# Patient Record
Sex: FEMALE | Race: WHITE | Employment: OTHER | URBAN - METROPOLITAN AREA
[De-identification: names, ages, dates, MRNs, and addresses within clinical notes are randomized per-mention and may not be internally consistent; named-entity substitution may affect disease eponyms.]

---

## 2017-05-16 ENCOUNTER — TELEPHONE (OUTPATIENT)
Dept: FAMILY MEDICINE CLINIC | Facility: CLINIC | Age: 27
End: 2017-05-16

## 2017-12-29 ENCOUNTER — OFFICE VISIT (OUTPATIENT)
Dept: FAMILY MEDICINE CLINIC | Facility: CLINIC | Age: 27
End: 2017-12-29

## 2017-12-29 VITALS
BODY MASS INDEX: 21 KG/M2 | SYSTOLIC BLOOD PRESSURE: 110 MMHG | WEIGHT: 117 LBS | HEART RATE: 120 BPM | DIASTOLIC BLOOD PRESSURE: 60 MMHG | TEMPERATURE: 98 F | OXYGEN SATURATION: 96 %

## 2017-12-29 DIAGNOSIS — R50.9 FEVER, UNSPECIFIED FEVER CAUSE: ICD-10-CM

## 2017-12-29 DIAGNOSIS — J02.9 SORE THROAT: Primary | ICD-10-CM

## 2017-12-29 DIAGNOSIS — J02.0 STREP THROAT: ICD-10-CM

## 2017-12-29 PROCEDURE — 87880 STREP A ASSAY W/OPTIC: CPT | Performed by: FAMILY MEDICINE

## 2017-12-29 PROCEDURE — 99214 OFFICE O/P EST MOD 30 MIN: CPT | Performed by: FAMILY MEDICINE

## 2017-12-29 RX ORDER — AMOXICILLIN 500 MG/1
500 CAPSULE ORAL 2 TIMES DAILY
Qty: 14 CAPSULE | Refills: 0 | Status: SHIPPED | OUTPATIENT
Start: 2017-12-29 | End: 2018-01-05

## 2017-12-29 RX ORDER — OSELTAMIVIR PHOSPHATE 75 MG/1
75 CAPSULE ORAL 2 TIMES DAILY
Qty: 10 CAPSULE | Refills: 0 | Status: SHIPPED | OUTPATIENT
Start: 2017-12-29 | End: 2017-12-29 | Stop reason: CLARIF

## 2017-12-29 NOTE — PROGRESS NOTES
HPI:   Dheeraj Donovan is a 33year old female who presents for upper respiratory symptoms for  2  days. Patient reports sore throat, congestion, fever with Tmax to 102, dry cough. Has diffuse myalgias fever and chills and shakes, ssore throat started today fever cause  Strep throat      Orders Placed This Encounter      Rapid Strep    Meds & Refills for this Visit:  Signed Prescriptions Disp Refills    amoxicillin 500 MG Oral Cap 14 capsule 0      Sig: Take 1 capsule (500 mg total) by mouth 2 (two) times rodrigo

## 2018-03-13 ENCOUNTER — OFFICE VISIT (OUTPATIENT)
Dept: FAMILY MEDICINE CLINIC | Facility: CLINIC | Age: 28
End: 2018-03-13

## 2018-03-13 VITALS
HEIGHT: 64 IN | SYSTOLIC BLOOD PRESSURE: 112 MMHG | HEART RATE: 64 BPM | RESPIRATION RATE: 16 BRPM | DIASTOLIC BLOOD PRESSURE: 78 MMHG | WEIGHT: 121.19 LBS | TEMPERATURE: 98 F | BODY MASS INDEX: 20.69 KG/M2

## 2018-03-13 DIAGNOSIS — I73.00 RAYNAUD'S PHENOMENON WITHOUT GANGRENE: Primary | ICD-10-CM

## 2018-03-13 DIAGNOSIS — M79.675 GREAT TOE PAIN, LEFT: ICD-10-CM

## 2018-03-13 PROCEDURE — 99213 OFFICE O/P EST LOW 20 MIN: CPT | Performed by: FAMILY MEDICINE

## 2018-03-13 NOTE — PROGRESS NOTES
Candy Kaplan is a 33year old female. 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  She also not Consults:  None     The patient indicates understanding of these issues and agrees to the plan. The patient is asked to return in prn.

## 2018-09-12 ENCOUNTER — OFFICE VISIT (OUTPATIENT)
Dept: FAMILY MEDICINE CLINIC | Facility: CLINIC | Age: 28
End: 2018-09-12
Payer: MEDICAID

## 2018-09-12 VITALS
SYSTOLIC BLOOD PRESSURE: 122 MMHG | BODY MASS INDEX: 20 KG/M2 | DIASTOLIC BLOOD PRESSURE: 70 MMHG | HEART RATE: 108 BPM | WEIGHT: 115.63 LBS | RESPIRATION RATE: 16 BRPM | TEMPERATURE: 100 F

## 2018-09-12 DIAGNOSIS — J06.9 VIRAL URI: ICD-10-CM

## 2018-09-12 DIAGNOSIS — J02.9 PHARYNGITIS, UNSPECIFIED ETIOLOGY: Primary | ICD-10-CM

## 2018-09-12 DIAGNOSIS — R50.9 FEVER, UNSPECIFIED FEVER CAUSE: ICD-10-CM

## 2018-09-12 LAB
CONTROL LINE PRESENT WITH A CLEAR BACKGROUND (YES/NO): YES YES/NO
STREP GRP A CUL-SCR: NEGATIVE

## 2018-09-12 PROCEDURE — 87880 STREP A ASSAY W/OPTIC: CPT | Performed by: FAMILY MEDICINE

## 2018-09-12 PROCEDURE — 99214 OFFICE O/P EST MOD 30 MIN: CPT | Performed by: FAMILY MEDICINE

## 2018-09-12 NOTE — PROGRESS NOTES
HPI:   Wes Gutiérrez is a 33year old female who presents for upper respiratory symptoms for  3  days. Patient reports sore throat, congestion, fever with Tmax to 101. No current outpatient medications on file.    Past Medical History:   Diagnosis Da No prescriptions requested or ordered in this encounter       Imaging & Consults:  None

## 2018-09-17 ENCOUNTER — TELEPHONE (OUTPATIENT)
Dept: FAMILY MEDICINE CLINIC | Facility: CLINIC | Age: 28
End: 2018-09-17

## 2018-09-17 RX ORDER — AMOXICILLIN AND CLAVULANATE POTASSIUM 500; 125 MG/1; MG/1
1 TABLET, FILM COATED ORAL 2 TIMES DAILY
Qty: 20 TABLET | Refills: 0 | Status: SHIPPED | OUTPATIENT
Start: 2018-09-17 | End: 2018-09-27

## 2018-09-17 NOTE — TELEPHONE ENCOUNTER
Patient was in last week to see Dr Wyatt Cuellar. Flu part is over but now has a sinus infection. Has been taking OTC Generic Mucinex and is not helping. Can Dr Wyatt Cuellar call something in for her?

## 2019-10-14 ENCOUNTER — OFFICE VISIT (OUTPATIENT)
Dept: FAMILY MEDICINE CLINIC | Facility: CLINIC | Age: 29
End: 2019-10-14
Payer: MEDICAID

## 2019-10-14 VITALS
RESPIRATION RATE: 18 BRPM | TEMPERATURE: 97 F | SYSTOLIC BLOOD PRESSURE: 126 MMHG | OXYGEN SATURATION: 98 % | DIASTOLIC BLOOD PRESSURE: 64 MMHG | HEART RATE: 68 BPM | HEIGHT: 63 IN | WEIGHT: 119.63 LBS | BODY MASS INDEX: 21.2 KG/M2

## 2019-10-14 DIAGNOSIS — R51.9 FACIAL PAIN: ICD-10-CM

## 2019-10-14 DIAGNOSIS — A46: Primary | ICD-10-CM

## 2019-10-14 DIAGNOSIS — S61.218A: ICD-10-CM

## 2019-10-14 DIAGNOSIS — M79.644 PAIN IN FINGER OF RIGHT HAND: ICD-10-CM

## 2019-10-14 PROCEDURE — 99213 OFFICE O/P EST LOW 20 MIN: CPT | Performed by: FAMILY MEDICINE

## 2019-10-14 PROCEDURE — 12031 INTMD RPR S/A/T/EXT 2.5 CM/<: CPT | Performed by: FAMILY MEDICINE

## 2019-10-14 RX ORDER — CLINDAMYCIN HYDROCHLORIDE 300 MG/1
CAPSULE ORAL 3 TIMES DAILY
COMMUNITY
End: 2021-04-03

## 2019-10-14 NOTE — PROGRESS NOTES
Barbara Hoff is a 30year old female. HPI:   Karin cut her finger about a week ago on some chicken wire, she waited 24 hours and then went to the ER where she was given a tdap and then placed on ABX because it was too late to suture.  The finger was m finger of right hand    The site was cleaned and then closed with dermabond to try and get better approximation, keep clean and dry and allow it to peel off itself    Meds & Refills for this Visit:  Requested Prescriptions      No prescriptions requested o

## 2020-07-16 ENCOUNTER — TELEPHONE (OUTPATIENT)
Dept: FAMILY MEDICINE CLINIC | Facility: CLINIC | Age: 30
End: 2020-07-16

## 2020-07-16 NOTE — TELEPHONE ENCOUNTER
Routing to Dr. Catherne Angelucci.     Future Appointments   Date Time Provider Silvia Cash   7/20/2020  3:40 PM Edith Moreland, [facility] ALONA [place]
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
## 2020-09-24 ENCOUNTER — TELEPHONE (OUTPATIENT)
Dept: FAMILY MEDICINE CLINIC | Facility: CLINIC | Age: 30
End: 2020-09-24

## 2020-09-24 DIAGNOSIS — R05.8 COUGH WITH EXPOSURE TO COVID-19 VIRUS: Primary | ICD-10-CM

## 2020-09-24 DIAGNOSIS — Z20.822 COUGH WITH EXPOSURE TO COVID-19 VIRUS: Primary | ICD-10-CM

## 2020-09-24 NOTE — TELEPHONE ENCOUNTER
Last contact with positive pt was last Thursday- they found out they were positive yesterday. There was no masking or social distancing during encounter    Pt states she is having fatigue, sinus pressure, runny nose and diarrhea.   Pt also reports swolle
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
## 2020-09-25 ENCOUNTER — APPOINTMENT (OUTPATIENT)
Dept: LAB | Age: 30
End: 2020-09-25
Attending: FAMILY MEDICINE
Payer: MEDICAID

## 2020-09-25 DIAGNOSIS — R05.8 COUGH WITH EXPOSURE TO COVID-19 VIRUS: ICD-10-CM

## 2020-09-25 DIAGNOSIS — Z20.822 COUGH WITH EXPOSURE TO COVID-19 VIRUS: ICD-10-CM

## 2020-09-28 ENCOUNTER — TELEPHONE (OUTPATIENT)
Dept: FAMILY MEDICINE CLINIC | Facility: CLINIC | Age: 30
End: 2020-09-28

## 2020-09-28 LAB — SARS-COV-2 RNA RESP QL NAA+PROBE: NOT DETECTED

## 2020-09-28 NOTE — TELEPHONE ENCOUNTER
----- Message from Julien Eckert DO sent at 9/28/2020  7:56 AM CDT -----  Can notify Her Covid was Negative

## 2021-04-03 ENCOUNTER — OFFICE VISIT (OUTPATIENT)
Dept: FAMILY MEDICINE CLINIC | Facility: CLINIC | Age: 31
End: 2021-04-03
Payer: MEDICAID

## 2021-04-03 VITALS
WEIGHT: 123 LBS | BODY MASS INDEX: 21.52 KG/M2 | SYSTOLIC BLOOD PRESSURE: 110 MMHG | TEMPERATURE: 99 F | DIASTOLIC BLOOD PRESSURE: 70 MMHG | HEART RATE: 93 BPM | HEIGHT: 63.5 IN | RESPIRATION RATE: 17 BRPM | OXYGEN SATURATION: 98 %

## 2021-04-03 DIAGNOSIS — L30.8 OTHER ECZEMA: Primary | ICD-10-CM

## 2021-04-03 DIAGNOSIS — L23.89 ALLERGIC CONTACT DERMATITIS DUE TO OTHER AGENTS: ICD-10-CM

## 2021-04-03 DIAGNOSIS — F90.9 ATTENTION DEFICIT HYPERACTIVITY DISORDER (ADHD), UNSPECIFIED ADHD TYPE: ICD-10-CM

## 2021-04-03 PROCEDURE — 99214 OFFICE O/P EST MOD 30 MIN: CPT | Performed by: FAMILY MEDICINE

## 2021-04-03 PROCEDURE — 3008F BODY MASS INDEX DOCD: CPT | Performed by: FAMILY MEDICINE

## 2021-04-03 PROCEDURE — 3078F DIAST BP <80 MM HG: CPT | Performed by: FAMILY MEDICINE

## 2021-04-03 PROCEDURE — 3074F SYST BP LT 130 MM HG: CPT | Performed by: FAMILY MEDICINE

## 2021-04-03 NOTE — PROGRESS NOTES
Olamide Hope is a 35year old female. HPI:   Karin is here for evaluation of rash , on her upper body and arms. She notes it was worse this winter and was spreading to her face, states it is dry and burns. Has a HX of allergic rhinitis.  Taking chemo normocephalic,ears and throat are clear  NECK: supple,no adenopathy,no bruits  LUNGS: clear to auscultation  CARDIO: RRR without murmur  GI: good BS's,no masses, HSM or tenderness  EXTREMITIES: no cyanosis, clubbing or edema    ASSESSMENT AND PLAN:     Oth

## 2021-06-16 ENCOUNTER — TELEMEDICINE (OUTPATIENT)
Dept: FAMILY MEDICINE CLINIC | Facility: CLINIC | Age: 31
End: 2021-06-16
Payer: MEDICAID

## 2021-06-16 DIAGNOSIS — R05.9 COUGH: Primary | ICD-10-CM

## 2021-06-16 DIAGNOSIS — R53.83 FATIGUE, UNSPECIFIED TYPE: ICD-10-CM

## 2021-06-16 DIAGNOSIS — R09.81 SINUS CONGESTION: ICD-10-CM

## 2021-06-16 PROCEDURE — 99213 OFFICE O/P EST LOW 20 MIN: CPT | Performed by: FAMILY MEDICINE

## 2021-06-16 NOTE — PROGRESS NOTES
This is a telemedicine visit with live, interactive video and audio. Patient understands and accepts financial responsibility for any deductible, co-insurance and/or co-pays associated with this service.     VASQUEZ Frazier has been sick for 3 days

## 2021-06-17 ENCOUNTER — LAB ENCOUNTER (OUTPATIENT)
Dept: LAB | Age: 31
End: 2021-06-17
Attending: FAMILY MEDICINE
Payer: MEDICAID

## 2021-06-17 DIAGNOSIS — R09.81 SINUS CONGESTION: ICD-10-CM

## 2021-06-17 DIAGNOSIS — R05.9 COUGH: ICD-10-CM

## 2021-06-18 ENCOUNTER — TELEPHONE (OUTPATIENT)
Dept: FAMILY MEDICINE CLINIC | Facility: CLINIC | Age: 31
End: 2021-06-18

## 2021-09-08 NOTE — TELEPHONE ENCOUNTER
LOV: 4/3/21   Last Refill: 2016    RN called pt- she states that DS discussed it with her in April,    She has been treating ADD Holistically - but it isn't currently     ADDERALL 20MG TABLETS/ D-AMPHETAMINE SALT COMBO 20MG TAB

## 2021-09-08 NOTE — TELEPHONE ENCOUNTER
Pt calling for prescription refill on     ADDERALL 20MG TABLETS/ D-AMPHETAMINE SALT COMBO 20MG TAB     Pt verified pharmacy on     [address] Julien Browne [address] [place] [address] ([address]), [phone], [phone]

## 2021-09-16 ENCOUNTER — TELEPHONE (OUTPATIENT)
Dept: FAMILY MEDICINE CLINIC | Facility: CLINIC | Age: 31
End: 2021-09-16

## 2021-09-16 NOTE — TELEPHONE ENCOUNTER
MERCEDES for pt to call back.   [facility] sent to pt kathy DIEZ for pt Amphetamine-dextroamphetamine 20mg was denied    Pt will have to pay out of pocket for this medication

## 2021-12-07 ENCOUNTER — TELEPHONE (OUTPATIENT)
Dept: FAMILY MEDICINE CLINIC | Facility: CLINIC | Age: 31
End: 2021-12-07

## 2021-12-07 ENCOUNTER — NURSE ONLY (OUTPATIENT)
Dept: FAMILY MEDICINE CLINIC | Facility: CLINIC | Age: 31
End: 2021-12-07
Payer: MEDICAID

## 2021-12-07 DIAGNOSIS — R35.0 URINARY FREQUENCY: Primary | ICD-10-CM

## 2021-12-07 PROCEDURE — 81003 URINALYSIS AUTO W/O SCOPE: CPT | Performed by: FAMILY MEDICINE

## 2021-12-07 NOTE — PROGRESS NOTES
Pt was in office for provider urine for testing    In office test was done and results sent to the provider

## 2021-12-07 NOTE — TELEPHONE ENCOUNTER
Pt states she is having frequency and discomfort when she is urinating. Does not describe it burning. Pt denies abdominal pressure and flank pain. SXS have been going on and off for a few months.   Pt has been trying to deal with it naturally with cra

## 2021-12-07 NOTE — TELEPHONE ENCOUNTER
Pt called she is wanting to know if she can come in to office and give a urine sample and get an antibiotic. She states that her body has been weird for the past week months and she has weird sensation when going to the bathroom.  She also said that she has

## 2021-12-07 NOTE — TELEPHONE ENCOUNTER
Pt was advised    Future Appointments   Date Time Provider Silvia Cash   12/7/2021  2:00 PM  [facility] [address] [facility] [place]

## 2021-12-07 NOTE — TELEPHONE ENCOUNTER
----- Message from Latonya Vázquez DO sent at 12/7/2021  4:21 PM CST -----  Can notify her Urine was negative, lets try some uristat for a couple

## 2022-02-02 ENCOUNTER — TELEPHONE (OUTPATIENT)
Dept: FAMILY MEDICINE CLINIC | Facility: CLINIC | Age: 32
End: 2022-02-02

## 2022-02-02 NOTE — TELEPHONE ENCOUNTER
Pt states she talked to her therapist this morning and they revisted her ADHD    She states she has been struggling recently. Pt stopped taking medication when she was pregnant with her son. Looks like we tried to send meds in September -and everything was denied    Her therapist told her there is a cheek swab test to tell her what medication would be best for her?     PT is going to come in next week for a medication visit so we can document her history on medication so we have information to provider to insurance    Future Appointments   Date Time Provider Silvia Cash   2/8/2022  2:20 PM Tara Moreland, [facility][note 3] Angélica Titus

## 2022-02-02 NOTE — TELEPHONE ENCOUNTER
Pt called would like a call back from nurse states has questions regarding a medication       Pt call back # [phone]    Thank you

## 2022-02-09 ENCOUNTER — TELEPHONE (OUTPATIENT)
Dept: FAMILY MEDICINE CLINIC | Facility: CLINIC | Age: 32
End: 2022-02-09

## 2022-02-09 NOTE — TELEPHONE ENCOUNTER
AGUSTÍN IS WORKING ON PRE-AUTH-  NEED DIAG AND CHART NOTES FOR PT.     #-[phone]      PLEASE ADVISE-THANKYOU

## 2022-03-21 ENCOUNTER — TELEPHONE (OUTPATIENT)
Dept: FAMILY MEDICINE CLINIC | Facility: CLINIC | Age: 32
End: 2022-03-21

## 2022-03-21 NOTE — TELEPHONE ENCOUNTER
Pt states started with sxs on Thursday last week    Has been taking Mucinex BID- but it is not helping much    R Ear  are clogged today, R lymph nodes are swollenn, eye feels heavy and goopy- but no drainage     Pt is having lots of sinus drainage- it is clear. PT is having PND and slight cough    No temp, no headache.     Routing to provider pt is looking for recommendations or meds called in

## 2022-03-21 NOTE — TELEPHONE ENCOUNTER
PT CALLED AND ADV HAS FACIAL PUFFINESS, EAR CLOGGED, SNOTTY, LOTS OF HEAD PRESSURE.    WOULD LIKE TO HAVE SOMETHING CALLED IN - PT ADV NEEDS TO GO TO WORK.     PLEASE SEND  [address]

## 2022-03-21 NOTE — TELEPHONE ENCOUNTER
Pt is coming in   Future Appointments   Date Time Provider Silvia Cash   3/22/2022  8:20 AM Good Mendieta DO [facility] [place]   3/29/2022 11:20 AM DO ABNER Shaw [facility] [place]     Pt advised that daughter will be with her and she does not want medications discussed at time of apt, she will keep her medication f/u for next week.

## 2022-03-22 ENCOUNTER — OFFICE VISIT (OUTPATIENT)
Dept: FAMILY MEDICINE CLINIC | Facility: CLINIC | Age: 32
End: 2022-03-22
Payer: MEDICAID

## 2022-03-22 VITALS
BODY MASS INDEX: 22.15 KG/M2 | SYSTOLIC BLOOD PRESSURE: 100 MMHG | DIASTOLIC BLOOD PRESSURE: 60 MMHG | OXYGEN SATURATION: 99 % | HEART RATE: 70 BPM | TEMPERATURE: 98 F | WEIGHT: 125 LBS | HEIGHT: 63 IN

## 2022-03-22 DIAGNOSIS — J02.8 PHARYNGITIS DUE TO OTHER ORGANISM: ICD-10-CM

## 2022-03-22 DIAGNOSIS — R09.81 SINUS CONGESTION: Primary | ICD-10-CM

## 2022-03-22 PROCEDURE — 3008F BODY MASS INDEX DOCD: CPT | Performed by: FAMILY MEDICINE

## 2022-03-22 PROCEDURE — 3078F DIAST BP <80 MM HG: CPT | Performed by: FAMILY MEDICINE

## 2022-03-22 PROCEDURE — 3074F SYST BP LT 130 MM HG: CPT | Performed by: FAMILY MEDICINE

## 2022-03-22 PROCEDURE — 99213 OFFICE O/P EST LOW 20 MIN: CPT | Performed by: FAMILY MEDICINE

## 2022-07-26 RX ORDER — DEXTROAMPHETAMINE SACCHARATE, AMPHETAMINE ASPARTATE, DEXTROAMPHETAMINE SULFATE AND AMPHETAMINE SULFATE 2.5; 2.5; 2.5; 2.5 MG/1; MG/1; MG/1; MG/1
TABLET ORAL
Qty: 30 TABLET | Refills: 0 | Status: SHIPPED | OUTPATIENT
Start: 2022-07-26

## 2022-07-26 NOTE — TELEPHONE ENCOUNTER
No refill protocol for this medication. Last refill: 5/24/2022 #30 with 0 refills  Last Visit: 5/24/2022  Next Visit: No future appointments. Forward to Dr. Nancy Bautista please advise on refills. Thanks.

## 2022-07-26 NOTE — TELEPHONE ENCOUNTER
[facility] DRUG STORE [address] [address] [address] AT [address] [facility] ([address]), [phone], [phone]   [address]   Phone: [phone] Fax: [phone]     PATIENT REQUESTING REFILL ON ADDERALL

## 2022-11-19 ENCOUNTER — TELEPHONE (OUTPATIENT)
Dept: FAMILY MEDICINE CLINIC | Facility: CLINIC | Age: 32
End: 2022-11-19

## 2022-11-19 RX ORDER — SULFAMETHOXAZOLE AND TRIMETHOPRIM 800; 160 MG/1; MG/1
1 TABLET ORAL 2 TIMES DAILY
Qty: 10 TABLET | Refills: 0 | Status: SHIPPED | OUTPATIENT
Start: 2022-11-19 | End: 2022-11-24

## 2022-11-19 NOTE — TELEPHONE ENCOUNTER
UTI symptoms x a couple weeks but they subsided a bit with natural remedies    Last night, she had very painful spasms and symptoms have worsened drastically    Urinary frequency  Pain with urination  Fatigued    Patient would like antibiotic    Please adv  Thank you

## 2022-11-19 NOTE — TELEPHONE ENCOUNTER
Spoke with patient, increase in frequency/pain while urinating/urgency/abdominal cramping x last night. No fever.     [facility] in [place] on [address] to provider    Please advise

## 2022-11-29 RX ORDER — DEXTROAMPHETAMINE SACCHARATE, AMPHETAMINE ASPARTATE, DEXTROAMPHETAMINE SULFATE AND AMPHETAMINE SULFATE 2.5; 2.5; 2.5; 2.5 MG/1; MG/1; MG/1; MG/1
TABLET ORAL
Qty: 30 TABLET | Refills: 0 | Status: SHIPPED | OUTPATIENT
Start: 2022-11-29

## 2022-11-29 NOTE — TELEPHONE ENCOUNTER
Routing to provider per protocol. amphetamine-dextroamphetamine (ADDERALL) 10 MG Oral Tab  Last refilled on 7/26/22 for #30  with 0 rf. Last seen on 5/24/22. No future appointments. Thank you.

## 2023-01-16 ENCOUNTER — PATIENT MESSAGE (OUTPATIENT)
Dept: FAMILY MEDICINE CLINIC | Facility: CLINIC | Age: 33
End: 2023-01-16

## 2023-02-04 ENCOUNTER — TELEPHONE (OUTPATIENT)
Dept: FAMILY MEDICINE CLINIC | Facility: CLINIC | Age: 33
End: 2023-02-04

## 2023-02-04 ENCOUNTER — OFFICE VISIT (OUTPATIENT)
Dept: FAMILY MEDICINE CLINIC | Facility: CLINIC | Age: 33
End: 2023-02-04
Payer: MEDICAID

## 2023-02-04 VITALS
SYSTOLIC BLOOD PRESSURE: 130 MMHG | HEIGHT: 63 IN | DIASTOLIC BLOOD PRESSURE: 76 MMHG | RESPIRATION RATE: 18 BRPM | WEIGHT: 117 LBS | HEART RATE: 80 BPM | TEMPERATURE: 98 F | BODY MASS INDEX: 20.73 KG/M2

## 2023-02-04 DIAGNOSIS — M54.2 NECK PAIN ON RIGHT SIDE: Primary | ICD-10-CM

## 2023-02-04 DIAGNOSIS — M25.511 ACUTE PAIN OF RIGHT SHOULDER: ICD-10-CM

## 2023-02-04 DIAGNOSIS — M54.12 CERVICAL RADICULOPATHY: ICD-10-CM

## 2023-02-04 DIAGNOSIS — M54.40 ACUTE RIGHT-SIDED LOW BACK PAIN WITH SCIATICA, SCIATICA LATERALITY UNSPECIFIED: ICD-10-CM

## 2023-02-04 PROCEDURE — 99214 OFFICE O/P EST MOD 30 MIN: CPT | Performed by: FAMILY MEDICINE

## 2023-02-04 PROCEDURE — 3078F DIAST BP <80 MM HG: CPT | Performed by: FAMILY MEDICINE

## 2023-02-04 PROCEDURE — 3008F BODY MASS INDEX DOCD: CPT | Performed by: FAMILY MEDICINE

## 2023-02-04 PROCEDURE — 3075F SYST BP GE 130 - 139MM HG: CPT | Performed by: FAMILY MEDICINE

## 2023-02-04 RX ORDER — CYCLOBENZAPRINE HCL 10 MG
10 TABLET ORAL NIGHTLY
Qty: 10 TABLET | Refills: 0 | Status: SHIPPED | OUTPATIENT
Start: 2023-02-04 | End: 2023-02-14

## 2023-02-04 RX ORDER — NABUMETONE 500 MG/1
500 TABLET, FILM COATED ORAL 2 TIMES DAILY
Qty: 30 TABLET | Refills: 0 | Status: SHIPPED | OUTPATIENT
Start: 2023-02-04 | End: 2023-02-18

## 2023-02-04 NOTE — TELEPHONE ENCOUNTER
Patient called states has been having some pain in body feels sore and can not move right arm, also has some swelling at times     Pt was offered Monday but would like to be seen today if possible       Pt call back # [phone]    Thank you.
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
## 2023-02-07 ENCOUNTER — TELEPHONE (OUTPATIENT)
Dept: FAMILY MEDICINE CLINIC | Facility: CLINIC | Age: 33
End: 2023-02-07

## 2023-02-07 NOTE — TELEPHONE ENCOUNTER
Pt states she doesn't really know what she is needing    She states she doesn't think pain is getting worse- but she states she also hasn't taken much time to heal.    Pt states she is going to try to get some rest and let us know if things do not change.

## 2023-02-07 NOTE — TELEPHONE ENCOUNTER
Pt called she was seen Saturday. She said that she is in a lot of pain and wants to know if she could get x-ray done? Can  place orders?

## 2023-02-13 ENCOUNTER — OFFICE VISIT (OUTPATIENT)
Dept: FAMILY MEDICINE CLINIC | Facility: CLINIC | Age: 33
End: 2023-02-13
Payer: MEDICAID

## 2023-02-13 VITALS
RESPIRATION RATE: 16 BRPM | OXYGEN SATURATION: 98 % | TEMPERATURE: 98 F | HEART RATE: 88 BPM | SYSTOLIC BLOOD PRESSURE: 120 MMHG | DIASTOLIC BLOOD PRESSURE: 68 MMHG

## 2023-02-13 DIAGNOSIS — M25.511 ACUTE PAIN OF RIGHT SHOULDER: ICD-10-CM

## 2023-02-13 DIAGNOSIS — M25.521 ELBOW PAIN, RIGHT: ICD-10-CM

## 2023-02-13 DIAGNOSIS — M54.40 ACUTE RIGHT-SIDED LOW BACK PAIN WITH SCIATICA, SCIATICA LATERALITY UNSPECIFIED: ICD-10-CM

## 2023-02-13 DIAGNOSIS — M54.2 NECK PAIN ON RIGHT SIDE: Primary | ICD-10-CM

## 2023-02-13 PROCEDURE — 3074F SYST BP LT 130 MM HG: CPT | Performed by: FAMILY MEDICINE

## 2023-02-13 PROCEDURE — 99214 OFFICE O/P EST MOD 30 MIN: CPT | Performed by: FAMILY MEDICINE

## 2023-02-13 PROCEDURE — 3078F DIAST BP <80 MM HG: CPT | Performed by: FAMILY MEDICINE

## 2023-02-27 ENCOUNTER — OFFICE VISIT (OUTPATIENT)
Dept: FAMILY MEDICINE CLINIC | Facility: CLINIC | Age: 33
End: 2023-02-27
Payer: MEDICAID

## 2023-02-27 VITALS
TEMPERATURE: 98 F | WEIGHT: 124 LBS | SYSTOLIC BLOOD PRESSURE: 112 MMHG | BODY MASS INDEX: 21.97 KG/M2 | HEIGHT: 63 IN | OXYGEN SATURATION: 98 % | HEART RATE: 76 BPM | DIASTOLIC BLOOD PRESSURE: 80 MMHG | RESPIRATION RATE: 18 BRPM

## 2023-02-27 DIAGNOSIS — M25.521 ELBOW PAIN, RIGHT: ICD-10-CM

## 2023-02-27 DIAGNOSIS — M54.2 NECK PAIN ON RIGHT SIDE: Primary | ICD-10-CM

## 2023-02-27 DIAGNOSIS — M54.40 ACUTE RIGHT-SIDED LOW BACK PAIN WITH SCIATICA, SCIATICA LATERALITY UNSPECIFIED: ICD-10-CM

## 2023-02-27 DIAGNOSIS — M25.511 ACUTE PAIN OF RIGHT SHOULDER: ICD-10-CM

## 2023-02-27 PROCEDURE — 3008F BODY MASS INDEX DOCD: CPT | Performed by: FAMILY MEDICINE

## 2023-02-27 PROCEDURE — 99213 OFFICE O/P EST LOW 20 MIN: CPT | Performed by: FAMILY MEDICINE

## 2023-02-27 PROCEDURE — 3074F SYST BP LT 130 MM HG: CPT | Performed by: FAMILY MEDICINE

## 2023-02-27 PROCEDURE — 3079F DIAST BP 80-89 MM HG: CPT | Performed by: FAMILY MEDICINE

## 2023-02-28 ENCOUNTER — PATIENT MESSAGE (OUTPATIENT)
Dept: FAMILY MEDICINE CLINIC | Facility: CLINIC | Age: 33
End: 2023-02-28

## 2023-02-28 DIAGNOSIS — S46.001A ROTATOR CUFF INJURY, RIGHT, INITIAL ENCOUNTER: ICD-10-CM

## 2023-02-28 DIAGNOSIS — G89.11 ACUTE SHOULDER PAIN DUE TO TRAUMA, RIGHT: Primary | ICD-10-CM

## 2023-02-28 DIAGNOSIS — M25.511 ACUTE SHOULDER PAIN DUE TO TRAUMA, RIGHT: Primary | ICD-10-CM

## 2023-02-28 DIAGNOSIS — R29.898 WEAKNESS OF SHOULDER: ICD-10-CM

## 2023-03-13 ENCOUNTER — TELEPHONE (OUTPATIENT)
Dept: LAB | Age: 33
End: 2023-03-13

## 2023-03-13 DIAGNOSIS — S43.421D SPRAIN OF RIGHT ROTATOR CUFF CAPSULE, SUBSEQUENT ENCOUNTER: ICD-10-CM

## 2023-03-13 DIAGNOSIS — G89.11 ACUTE SHOULDER PAIN DUE TO TRAUMA, RIGHT: Primary | ICD-10-CM

## 2023-03-13 DIAGNOSIS — M25.511 ACUTE SHOULDER PAIN DUE TO TRAUMA, RIGHT: Primary | ICD-10-CM

## 2023-03-13 NOTE — TELEPHONE ENCOUNTER
The required for pt's MRI Shoulder has been denied by pt's health plan. [employer] is requesting a P2P that needs to be completed by 3/16/2023.     P2P # [phone], Option 4  Reference # [id number]    Pt is being notified of denial.    The following is the denial letter received from Ana Schmidt:

## 2023-03-16 NOTE — TELEPHONE ENCOUNTER
Pt was advised of the decision below- verbalized understanding    Pt states right now she has had improvement, but she is going to look into PT options and get back to us

## 2023-03-21 ENCOUNTER — TELEPHONE (OUTPATIENT)
Dept: FAMILY MEDICINE CLINIC | Facility: CLINIC | Age: 33
End: 2023-03-21

## 2023-03-21 DIAGNOSIS — S46.001A ROTATOR CUFF INJURY, RIGHT, INITIAL ENCOUNTER: ICD-10-CM

## 2023-03-21 DIAGNOSIS — R29.898 WEAKNESS OF SHOULDER: ICD-10-CM

## 2023-03-21 DIAGNOSIS — G89.11 ACUTE SHOULDER PAIN DUE TO TRAUMA, RIGHT: Primary | ICD-10-CM

## 2023-03-21 DIAGNOSIS — M25.511 ACUTE SHOULDER PAIN DUE TO TRAUMA, RIGHT: Primary | ICD-10-CM

## 2023-03-21 NOTE — TELEPHONE ENCOUNTER
RN LM for pt that message with information was sent via [address] number provided for any follow up questions

## 2023-03-21 NOTE — TELEPHONE ENCOUNTER
PT CALLED AND ADV THAT SHE HAS NO PREFERENCE ON PHYSICAL THERAPY     PLEASE CALL AND ADV    THANK YOU

## 2023-04-13 ENCOUNTER — APPOINTMENT (OUTPATIENT)
Dept: PHYSICAL THERAPY | Age: 33
End: 2023-04-13
Attending: FAMILY MEDICINE
Payer: MEDICAID

## 2023-04-18 ENCOUNTER — TELEPHONE (OUTPATIENT)
Dept: PHYSICAL THERAPY | Facility: HOSPITAL | Age: 33
End: 2023-04-18

## 2023-04-18 NOTE — TELEPHONE ENCOUNTER
Rn spoke with Megan Trevino at [facility] and verified pt diagnosis and current dosage of medication    Megan Trevino states they have all the information that is needed- will await approval Detail Level: Zone Continue Regimen: clindamycin 1 % topical gel TP \\nSig: Apply every morning to acne\\n\\ntretinoin 0.1 % topical cream QHS\\nSig: Apply a pea size amount to acne QHS as skin tolerates Continue Regimen: Eucrisa 2 % topical ointment BID\\nSig: Apply to mild rash on hands BID as needed

## 2023-04-21 ENCOUNTER — APPOINTMENT (OUTPATIENT)
Dept: PHYSICAL THERAPY | Age: 33
End: 2023-04-21
Attending: FAMILY MEDICINE
Payer: MEDICAID

## 2023-04-25 ENCOUNTER — OFFICE VISIT (OUTPATIENT)
Dept: PHYSICAL THERAPY | Age: 33
End: 2023-04-25
Attending: FAMILY MEDICINE
Payer: MEDICAID

## 2023-04-25 PROCEDURE — 97110 THERAPEUTIC EXERCISES: CPT

## 2023-04-25 PROCEDURE — 97162 PT EVAL MOD COMPLEX 30 MIN: CPT

## 2023-04-28 ENCOUNTER — OFFICE VISIT (OUTPATIENT)
Dept: PHYSICAL THERAPY | Age: 33
End: 2023-04-28
Attending: FAMILY MEDICINE
Payer: MEDICAID

## 2023-04-28 PROCEDURE — 97140 MANUAL THERAPY 1/> REGIONS: CPT

## 2023-04-28 PROCEDURE — 97110 THERAPEUTIC EXERCISES: CPT

## 2023-05-01 ENCOUNTER — OFFICE VISIT (OUTPATIENT)
Dept: PHYSICAL THERAPY | Age: 33
End: 2023-05-01
Attending: FAMILY MEDICINE
Payer: MEDICAID

## 2023-05-01 PROCEDURE — 97140 MANUAL THERAPY 1/> REGIONS: CPT

## 2023-05-01 PROCEDURE — 97110 THERAPEUTIC EXERCISES: CPT

## 2023-05-02 ENCOUNTER — TELEPHONE (OUTPATIENT)
Dept: PHYSICAL THERAPY | Facility: HOSPITAL | Age: 33
End: 2023-05-02

## 2023-05-04 ENCOUNTER — TELEPHONE (OUTPATIENT)
Dept: PHYSICAL THERAPY | Facility: HOSPITAL | Age: 33
End: 2023-05-04

## 2023-05-05 ENCOUNTER — APPOINTMENT (OUTPATIENT)
Dept: PHYSICAL THERAPY | Age: 33
End: 2023-05-05
Attending: FAMILY MEDICINE
Payer: MEDICAID

## 2023-05-09 ENCOUNTER — OFFICE VISIT (OUTPATIENT)
Dept: PHYSICAL THERAPY | Age: 33
End: 2023-05-09
Attending: FAMILY MEDICINE
Payer: MEDICAID

## 2023-05-09 PROCEDURE — 97110 THERAPEUTIC EXERCISES: CPT

## 2023-05-09 PROCEDURE — 97140 MANUAL THERAPY 1/> REGIONS: CPT

## 2023-05-11 ENCOUNTER — OFFICE VISIT (OUTPATIENT)
Dept: FAMILY MEDICINE CLINIC | Facility: CLINIC | Age: 33
End: 2023-05-11
Payer: MEDICAID

## 2023-05-11 VITALS
WEIGHT: 124 LBS | OXYGEN SATURATION: 98 % | DIASTOLIC BLOOD PRESSURE: 74 MMHG | BODY MASS INDEX: 22 KG/M2 | HEART RATE: 91 BPM | SYSTOLIC BLOOD PRESSURE: 112 MMHG | RESPIRATION RATE: 16 BRPM | TEMPERATURE: 98 F

## 2023-05-11 DIAGNOSIS — S46.001A ROTATOR CUFF INJURY, RIGHT, INITIAL ENCOUNTER: ICD-10-CM

## 2023-05-11 DIAGNOSIS — R29.898 WEAKNESS OF SHOULDER: Primary | ICD-10-CM

## 2023-05-11 PROCEDURE — 3078F DIAST BP <80 MM HG: CPT | Performed by: FAMILY MEDICINE

## 2023-05-11 PROCEDURE — 3074F SYST BP LT 130 MM HG: CPT | Performed by: FAMILY MEDICINE

## 2023-05-11 PROCEDURE — 99214 OFFICE O/P EST MOD 30 MIN: CPT | Performed by: FAMILY MEDICINE

## 2023-05-11 RX ORDER — CYCLOBENZAPRINE HCL 10 MG
10 TABLET ORAL NIGHTLY
Qty: 10 TABLET | Refills: 0 | Status: SHIPPED | OUTPATIENT
Start: 2023-05-11 | End: 2023-05-21

## 2023-05-16 ENCOUNTER — OFFICE VISIT (OUTPATIENT)
Dept: PHYSICAL THERAPY | Age: 33
End: 2023-05-16
Attending: FAMILY MEDICINE
Payer: MEDICAID

## 2023-05-16 PROCEDURE — 97140 MANUAL THERAPY 1/> REGIONS: CPT

## 2023-05-23 ENCOUNTER — OFFICE VISIT (OUTPATIENT)
Dept: PHYSICAL THERAPY | Age: 33
End: 2023-05-23
Attending: FAMILY MEDICINE
Payer: MEDICAID

## 2023-05-23 PROCEDURE — 97110 THERAPEUTIC EXERCISES: CPT

## 2023-05-23 PROCEDURE — 97140 MANUAL THERAPY 1/> REGIONS: CPT

## 2023-05-24 ENCOUNTER — TELEPHONE (OUTPATIENT)
Dept: FAMILY MEDICINE CLINIC | Facility: CLINIC | Age: 33
End: 2023-05-24

## 2023-05-24 NOTE — TELEPHONE ENCOUNTER
RN received authorization for MRI CPT 93796    Sart date: 5/23/23 through 7/7/23    Must be scheduled through [facility]

## 2023-05-27 ENCOUNTER — HOSPITAL ENCOUNTER (OUTPATIENT)
Dept: MRI IMAGING | Facility: HOSPITAL | Age: 33
Discharge: HOME | End: 2023-05-27
Attending: FAMILY MEDICINE
Payer: MEDICAID

## 2023-05-27 DIAGNOSIS — R29.898 WEAKNESS OF SHOULDER: ICD-10-CM

## 2023-05-27 DIAGNOSIS — S46.001A ROTATOR CUFF INJURY, RIGHT, INITIAL ENCOUNTER: ICD-10-CM

## 2023-05-27 PROCEDURE — 73221 MRI JOINT UPR EXTREM W/O DYE: CPT | Performed by: FAMILY MEDICINE

## 2023-05-30 ENCOUNTER — TELEPHONE (OUTPATIENT)
Dept: FAMILY MEDICINE CLINIC | Facility: CLINIC | Age: 33
End: 2023-05-30

## 2023-05-30 DIAGNOSIS — M25.511 ACUTE SHOULDER PAIN DUE TO TRAUMA, RIGHT: Primary | ICD-10-CM

## 2023-05-30 DIAGNOSIS — G89.11 ACUTE SHOULDER PAIN DUE TO TRAUMA, RIGHT: Primary | ICD-10-CM

## 2023-05-30 DIAGNOSIS — M24.111 LABRAL TEAR OF SHOULDER, DEGENERATIVE, RIGHT: ICD-10-CM

## 2023-05-30 NOTE — TELEPHONE ENCOUNTER
----- Message from Khadar Enriquez DO sent at 5/30/2023  8:03 AM CDT -----  Please notify Yarelisdanielaoksana Peter, she has a tear of the cartilage in her shoulder, called the Labrum which is the rim that keeps the upper arm on the plate of the shoulder. She is going ot need to se ortho. placed referral for Dr. Guillermo Jefferson, with [facility]

## 2023-05-31 ENCOUNTER — TELEPHONE (OUTPATIENT)
Dept: ORTHOPEDICS CLINIC | Facility: CLINIC | Age: 33
End: 2023-05-31

## 2023-05-31 NOTE — TELEPHONE ENCOUNTER
Patient scheduled with Dr. Saulo Emery for a right shoulder injury. MRI in [facility], no xray in epic.      Future Appointments   Date Time Provider Silvia Cash   6/7/2023 10:40 AM Cortney Daley MD [facility] [id number]

## 2023-06-07 ENCOUNTER — OFFICE VISIT (OUTPATIENT)
Dept: ORTHOPEDICS CLINIC | Facility: CLINIC | Age: 33
End: 2023-06-07
Payer: MEDICAID

## 2023-06-07 ENCOUNTER — HOSPITAL ENCOUNTER (OUTPATIENT)
Dept: GENERAL RADIOLOGY | Age: 33
Discharge: HOME | End: 2023-06-07
Attending: ORTHOPAEDIC SURGERY
Payer: MEDICAID

## 2023-06-07 VITALS — HEIGHT: 63 IN | WEIGHT: 120 LBS | BODY MASS INDEX: 21.26 KG/M2

## 2023-06-07 DIAGNOSIS — M54.12 CERVICAL RADICULITIS: ICD-10-CM

## 2023-06-07 DIAGNOSIS — M54.12 CERVICAL RADICULITIS: Primary | ICD-10-CM

## 2023-06-07 PROCEDURE — 99204 OFFICE O/P NEW MOD 45 MIN: CPT | Performed by: ORTHOPAEDIC SURGERY

## 2023-06-07 PROCEDURE — 72052 X-RAY EXAM NECK SPINE 6/>VWS: CPT | Performed by: ORTHOPAEDIC SURGERY

## 2023-06-07 PROCEDURE — 3008F BODY MASS INDEX DOCD: CPT | Performed by: ORTHOPAEDIC SURGERY

## 2023-07-10 ENCOUNTER — HOSPITAL ENCOUNTER (OUTPATIENT)
Dept: MRI IMAGING | Age: 33
Discharge: HOME | End: 2023-07-10
Attending: ORTHOPAEDIC SURGERY
Payer: MEDICAID

## 2023-07-10 DIAGNOSIS — M54.12 CERVICAL RADICULITIS: ICD-10-CM

## 2023-07-10 PROCEDURE — 72141 MRI NECK SPINE W/O DYE: CPT | Performed by: ORTHOPAEDIC SURGERY

## 2023-08-04 ENCOUNTER — TELEPHONE (OUTPATIENT)
Dept: FAMILY MEDICINE CLINIC | Facility: CLINIC | Age: 33
End: 2023-08-04

## 2023-08-04 NOTE — TELEPHONE ENCOUNTER
PT CALLED AND ADV IS HAVING AN EXTREME AMOUNT OF ANXIETY. PT HAS FUTURE APT BUT IS REALLY LOOKING TO BE SEEN SOONER OR OTHER RECOMMENATIONS. ADV PT IS SHE IS AT ANY HARM TO HERSELF OR OTHERS, SHE SAID NO BUT VERY WORRIED ABOUT THE SAFETY OF HER CHILDREN.     PLEASE ADV    Future Appointments   Date Time Provider Silvia Cash   8/15/2023  9:00 AM Tara Moreland, [facility] ALONA [place]

## 2023-08-04 NOTE — TELEPHONE ENCOUNTER
Patient states her anxiety has been increasing lately  States she is dealing with legalities with her children. Dealing with  and social workers. States she prefers to use plant medicine to deal with anxiety due to bad experiences with antianxiety meds    States she is unable to manage her anxiety at this time and would like a mild/low does medication to help during this time. Has appt scheduled 8/15/23 but would like to be seen sooner or start something before appt. Patient aware DS out of office until Monday. Patient states she has no thoughts of harming herself or others. States she and her children are safe. Advised if anything changes she should seek care in the ER.     Routed to DS to advise further

## 2023-08-05 NOTE — TELEPHONE ENCOUNTER
It looks like she's been on a little bit f everything in the past, can she recall which one gave her the ;east amount of side effects?

## 2023-08-05 NOTE — TELEPHONE ENCOUNTER
Spoke with patient, she can not recall any that did not have terrible side effects. She was wanting something natural. Patient is very sensitive when taking medication. Patient is going to go to natural pharmacy. Any other recommendations from DS?     Please advise    Thank you

## 2023-08-07 NOTE — TELEPHONE ENCOUNTER
Spoke with patient, advised note below.      Patient v/u    Will try natural medication, if that does not work will discuss options at visit on 8/15/23

## 2023-08-08 ENCOUNTER — PATIENT MESSAGE (OUTPATIENT)
Dept: ORTHOPEDICS CLINIC | Facility: CLINIC | Age: 33
End: 2023-08-08

## 2023-08-08 NOTE — TELEPHONE ENCOUNTER
Patient called asking if we had access to the message requesting the letter. Patient stated that she procrastinated in requesting the letter and needs it soon. Please advise.    Future Appointments   Date Time Provider Silvia Cash   8/15/2023  9:00 AM Juliane Garcia DO [facility] ALONA [place]   9/8/2023 10:00 AM Aurelio Cid DO [facility]

## 2023-08-15 ENCOUNTER — TELEPHONE (OUTPATIENT)
Dept: FAMILY MEDICINE CLINIC | Facility: CLINIC | Age: 33
End: 2023-08-15

## 2023-08-15 NOTE — TELEPHONE ENCOUNTER
Received paperwork from [facility] for PA on patient's methylphenidate HCL 5 mg

## 2023-08-18 NOTE — TELEPHONE ENCOUNTER
Approved   8/17/2023  2:14 PM  Case ID: [id number] Appeal supported: No  Note from payer:  The case has been Approved from [id number]   Payer: [address] [place] [address] Road    [phone]    [address][phone] - advised of note above - pharmacist v/u, stated pt picked up w/ [employer] coupon on 08/15/23 ~$11    [facility] sent to pt  Notify me if not read by 08/25/23

## 2023-09-05 DIAGNOSIS — F98.8 ADD (ATTENTION DEFICIT DISORDER) WITHOUT HYPERACTIVITY: ICD-10-CM

## 2023-09-05 RX ORDER — METHYLPHENIDATE HYDROCHLORIDE 5 MG/1
5 TABLET ORAL DAILY
Qty: 30 TABLET | Refills: 0 | Status: SHIPPED | OUTPATIENT
Start: 2023-09-05 | End: 2023-10-05

## 2023-09-05 NOTE — TELEPHONE ENCOUNTER
No refill protocol for this medication. Last refill: 8/15/2023 #30 with 0 refills  Last Visit: 8/15/2023   Next Visit:   Future Appointments   Date Time Provider Silvia Cash   9/14/2023  4:20 PM Tracie Moreland, [facility] ALONA [place]         Forward to Dr. Randal Goins please advise on refills. Thanks.

## 2023-09-05 NOTE — TELEPHONE ENCOUNTER
[facility] DR[facility] STORE [address] [place] [address] Pedro Rodriguezkenrick [address] [place] [address] ([address]), [phone], [phone] [address] Phone: [phone] Fax: [phone] Hours: Not open 24 hours     PATIENT REQUESTS REFILL ON METHYLIN 5MG

## 2023-09-14 ENCOUNTER — OFFICE VISIT (OUTPATIENT)
Dept: PHYSICAL MEDICINE AND REHAB | Facility: CLINIC | Age: 33
End: 2023-09-14
Payer: MEDICAID

## 2023-09-14 VITALS
HEIGHT: 63 IN | HEART RATE: 60 BPM | BODY MASS INDEX: 20.02 KG/M2 | OXYGEN SATURATION: 98 % | SYSTOLIC BLOOD PRESSURE: 112 MMHG | DIASTOLIC BLOOD PRESSURE: 60 MMHG | WEIGHT: 113 LBS

## 2023-09-14 DIAGNOSIS — S49.91XD INJURY OF RIGHT SHOULDER, SUBSEQUENT ENCOUNTER: ICD-10-CM

## 2023-09-14 DIAGNOSIS — M79.18 CERVICAL MYOFASCIAL PAIN SYNDROME: Primary | ICD-10-CM

## 2023-09-14 PROCEDURE — 3008F BODY MASS INDEX DOCD: CPT | Performed by: PHYSICAL MEDICINE & REHABILITATION

## 2023-09-14 PROCEDURE — 3074F SYST BP LT 130 MM HG: CPT | Performed by: PHYSICAL MEDICINE & REHABILITATION

## 2023-09-14 PROCEDURE — 3078F DIAST BP <80 MM HG: CPT | Performed by: PHYSICAL MEDICINE & REHABILITATION

## 2023-09-14 PROCEDURE — 99204 OFFICE O/P NEW MOD 45 MIN: CPT | Performed by: PHYSICAL MEDICINE & REHABILITATION

## 2023-10-03 ENCOUNTER — TELEPHONE (OUTPATIENT)
Dept: FAMILY MEDICINE CLINIC | Facility: CLINIC | Age: 33
End: 2023-10-03

## 2023-10-03 RX ORDER — METHYLPHENIDATE HYDROCHLORIDE 5 MG/1
10 TABLET ORAL DAILY
Qty: 60 TABLET | Refills: 0 | Status: SHIPPED | OUTPATIENT
Start: 2023-10-03 | End: 2023-11-02

## 2023-10-03 NOTE — TELEPHONE ENCOUNTER
PT CALLED AND ADV THAT PHARMACY DOES NOT HAVE MEDS IN STOCK.    methylphenidate 10 MG Oral Tab       WANTING TO KNOW IF DR CAN PRESCRIBE 5 MG X 2 A DAY.     WAS ADV 10MG ON BACK ORDER     PLEASE SEND TO   [address]

## 2023-11-08 ENCOUNTER — TELEPHONE (OUTPATIENT)
Dept: FAMILY MEDICINE CLINIC | Facility: CLINIC | Age: 33
End: 2023-11-08

## 2023-11-08 RX ORDER — SULFAMETHOXAZOLE AND TRIMETHOPRIM 800; 160 MG/1; MG/1
1 TABLET ORAL 2 TIMES DAILY
Qty: 14 TABLET | Refills: 0 | Status: SHIPPED | OUTPATIENT
Start: 2023-11-08

## 2023-11-08 NOTE — TELEPHONE ENCOUNTER
Pt states she has a UTI and would like medication called in. Pt has had them before. Please send to:    Allegra Corbin [address] [facility], [address] [facility] ([address]), [phone], [phone]   [address] [place] [address]   Phone: [phone] Fax: [phone]     Thank you!

## 2023-11-08 NOTE — TELEPHONE ENCOUNTER
Spoke with the pt and advised of the medication and the need to follow up if not improved next week- she v/u

## 2023-11-08 NOTE — TELEPHONE ENCOUNTER
Spoke with the pt   Symptoms Started yesterday  Pain and pressure, no frequency  No fever- feels swollen in the front and back    No discharge    Feels the same as previous episode.     Can not come give a urine sample today- she is working until 5

## 2023-11-20 ENCOUNTER — TELEPHONE (OUTPATIENT)
Dept: FAMILY MEDICINE CLINIC | Facility: CLINIC | Age: 33
End: 2023-11-20

## 2023-11-20 RX ORDER — SULFAMETHOXAZOLE AND TRIMETHOPRIM 800; 160 MG/1; MG/1
1 TABLET ORAL 2 TIMES DAILY
Qty: 10 TABLET | Refills: 0 | Status: SHIPPED | OUTPATIENT
Start: 2023-11-20 | End: 2023-11-25

## 2023-12-05 ENCOUNTER — PATIENT MESSAGE (OUTPATIENT)
Dept: FAMILY MEDICINE CLINIC | Facility: CLINIC | Age: 33
End: 2023-12-05

## 2023-12-05 RX ORDER — METHYLPHENIDATE HYDROCHLORIDE 5 MG/1
10 TABLET ORAL DAILY
Qty: 60 TABLET | Refills: 0 | Status: SHIPPED | OUTPATIENT
Start: 2023-12-05 | End: 2024-01-04

## 2023-12-05 NOTE — TELEPHONE ENCOUNTER
From: Eric Bro  To: Ayla Del Valle  Sent: 12/5/2023 12:05 PM CST  Subject: Refill    Hello! Can you please send request for refill of Ritalin to [facility] [place]? Thank you.

## 2023-12-05 NOTE — TELEPHONE ENCOUNTER
Routing to provider per protocol. methylphenidate 5 MG Oral Tab    Last refilled on 10/3/23 for #60  with 0 rf. Last seen on 9/14/23 telemedicine. No future appointments. Thank you.

## 2024-01-03 ENCOUNTER — MED REC SCAN ONLY (OUTPATIENT)
Dept: PHYSICAL MEDICINE AND REHAB | Facility: CLINIC | Age: 34
End: 2024-01-03

## 2024-01-18 ENCOUNTER — TELEPHONE (OUTPATIENT)
Dept: PHYSICAL MEDICINE AND REHAB | Facility: CLINIC | Age: 34
End: 2024-01-18

## 2024-01-18 DIAGNOSIS — M79.18 CERVICAL MYOFASCIAL PAIN SYNDROME: Primary | ICD-10-CM

## 2024-01-18 NOTE — TELEPHONE ENCOUNTER
Patient called stating that she has 2 PT visits left, she states that her physical therapist mentioned that she should have more improvement and is recommending to touch base with the office. Patient states she is unsure if more PT might be needed or not. Please call patient back at [phone].

## 2024-01-22 NOTE — TELEPHONE ENCOUNTER
S/w patient who states that she has since completed PT w/o relief she is not sure what her next steps should be. Pt stating her physical therapist is not sure if additional PT will be helpful or provide additional improvement. Pt stating she would like additional PT orders while she awaits NOV.     NOV has been scheduled for: 02/07/2024 by this RN.     Informed pt that since during LOV was 09/14/23 will forward to Dr. Driver for his review and signature if appropriate.

## 2024-02-07 ENCOUNTER — OFFICE VISIT (OUTPATIENT)
Dept: PHYSICAL MEDICINE AND REHAB | Facility: CLINIC | Age: 34
End: 2024-02-07
Payer: MEDICAID

## 2024-02-07 VITALS — HEIGHT: 63 IN | WEIGHT: 129 LBS | BODY MASS INDEX: 22.86 KG/M2

## 2024-02-07 DIAGNOSIS — M79.18 CERVICAL MYOFASCIAL PAIN SYNDROME: Primary | ICD-10-CM

## 2024-02-07 DIAGNOSIS — S49.91XD INJURY OF RIGHT SHOULDER, SUBSEQUENT ENCOUNTER: ICD-10-CM

## 2024-02-07 PROCEDURE — 99213 OFFICE O/P EST LOW 20 MIN: CPT | Performed by: PHYSICAL MEDICINE & REHABILITATION

## 2024-02-07 NOTE — PROGRESS NOTES
RETURN PATIENT VISIT    CHIEF COMPLAINT  Neck, right shoulder, right middle back pain.     INTERVAL HISTORY  Karin Bro is a 33 year old who was last seen in clinic on 9/14/23 at which time she was sent to PT for myofascial treatments. She states that she did have someimprovement, but states that she was unable to attend PT where recommended due to limitations with insurance. She states that she is not currently using any prescription or OTC medications for her pain.     She felt improvement in her symptoms and tension in the right shoulder with dry needling at PT. Unfortunately she only underwent two sessions of this as her [employer] company didn't approve the recommended locations for PT.     She feels that overall she has had improvement with PT. With lifting in PT she states that caused some pain but this eventaully ended in progress.     She continues to endorse some tension in the neck and shoulder.     REVIEW OF SYSTEMS  Review of systems was completed with the patient today as pertinent to today's visit    PHYSICAL EXAMINATION  CONSTITUTIONAL: Well-appearing, in no apparent distress  EYES: No scleral icterus or conjunctival hemorrhage  CARDIOVASCULAR: Skin warm and well-perfused, no peripheral edema  RESPIRATORY: Breathing unlabored without accessory muscle use  PSYCHIATRIC: Alert, cooperative, appropriate mood and affect  SKIN: No lesions or rashes on exposed skin  MUSCULOSKELETAL: Tension through the right greater than left trapezius and right cervical paraspinal muscles. She has full range of motion in the right shoulder and cervical spine but with tension.   NEUROLOGIC: Spurlings maneuver is negative.     REVIEW OF PRIOR X-RAYS/STUDIES  MRI of the cervical spine from July 2023 shows mild degenerative changes in the middle cervical spine without significant neuroforaminal or central canal narrowing.     IMPRESSION/DIAGNOSIS  1.    Encounter Diagnoses   Name Primary?    Cervical myofascial pain  syndrome Yes    Injury of right shoulder, subsequent encounter        TREATMENT/PLAN  Return to physical therapy working with the myofascial therapist for release of the right shoulder, right trapezius as well as returning her to full her range of motion functional status.    We will defer intervention for now she seems to be improving with the current plan.  She would benefit from dedicated myofascial treatments    Education was provided regarding the above impression/diagnosis and treatment options/plan were discussed.  All questions were answered during today's visit.  Patient will contact clinic if any other questions or concerns.        Bryson Driver DO  Physical Medicine and Rehabilitation  Interventional Spine and Sports Medicine   [facility]

## 2024-06-04 DIAGNOSIS — F98.8 ADD (ATTENTION DEFICIT DISORDER) WITHOUT HYPERACTIVITY: ICD-10-CM

## 2024-06-04 RX ORDER — DEXTROAMPHETAMINE SACCHARATE, AMPHETAMINE ASPARTATE, DEXTROAMPHETAMINE SULFATE AND AMPHETAMINE SULFATE 2.5; 2.5; 2.5; 2.5 MG/1; MG/1; MG/1; MG/1
10 TABLET ORAL DAILY
Qty: 30 TABLET | Refills: 0 | Status: SHIPPED | OUTPATIENT
Start: 2024-06-04

## 2024-06-04 NOTE — TELEPHONE ENCOUNTER
Routing to provider per protocol.   amphetamine-dextroamphetamine (ADDERALL) 10 MG Oral Tab   Last refilled on 4/15/24 for #30  with 0 rf.   Last labs 6/11/20.   Last seen on 4/15/24.     No future appointments.       Thank you.

## 2024-07-31 DIAGNOSIS — F98.8 ADD (ATTENTION DEFICIT DISORDER) WITHOUT HYPERACTIVITY: ICD-10-CM

## 2024-07-31 RX ORDER — DEXTROAMPHETAMINE SACCHARATE, AMPHETAMINE ASPARTATE, DEXTROAMPHETAMINE SULFATE AND AMPHETAMINE SULFATE 2.5; 2.5; 2.5; 2.5 MG/1; MG/1; MG/1; MG/1
10 TABLET ORAL DAILY
Qty: 30 TABLET | Refills: 0 | Status: SHIPPED | OUTPATIENT
Start: 2024-07-31

## 2024-07-31 NOTE — TELEPHONE ENCOUNTER
Routing to provider per protocol.   amphetamine-dextroamphetamine (ADDERALL) 10 MG Oral Tab   Last refilled on 6/4/24 for #30  with 0 rf.   Last labs 6/11/20.   Last seen on 4/15/24.     No future appointments.       Thank you.

## 2024-08-30 ENCOUNTER — TELEPHONE (OUTPATIENT)
Dept: FAMILY MEDICINE CLINIC | Facility: CLINIC | Age: 34
End: 2024-08-30

## 2024-08-30 NOTE — TELEPHONE ENCOUNTER
Advised patient of note below. Patient verbalized understanding and stated she is no longer taking Rx methylphenidate, does not need refill    Advised pt will disregard PA request and will remove from medication list - she v/u. No further questions at this time.    Disregard PA request

## 2024-08-30 NOTE — TELEPHONE ENCOUNTER
Received fax from [employer] regarding PA for Rx methylphenidate 5 MG Oral Tab   \"PA is about to \"    Reviewed pt's chart -   Last refill on 24, for #30, with 0 refills  amphetamine-dextroamphetamine (ADDERALL) 10 MG Oral Tab     Last refill on 24, for #60 tabs, with 0 refills  methylphenidate 5 MG Oral Tab

## 2025-01-09 ENCOUNTER — TELEPHONE (OUTPATIENT)
Dept: FAMILY MEDICINE CLINIC | Facility: CLINIC | Age: 35
End: 2025-01-09

## 2025-01-09 DIAGNOSIS — F98.8 ADD (ATTENTION DEFICIT DISORDER) WITHOUT HYPERACTIVITY: ICD-10-CM

## 2025-01-09 NOTE — TELEPHONE ENCOUNTER
LOV  4/15/24  LRF 7/31/24      Left message for the pt to callback  We have not filled this medication for 6 months- need to clarify why the request now.

## 2025-01-09 NOTE — TELEPHONE ENCOUNTER
PT CALLED AND ADV NEEDS REFILL OF     amphetamine-dextroamphetamine (ADDERALL) 10 MG Oral Tab     PLEASE SEND TO LEONELA GUZMAN     THANK YOU

## 2025-01-10 RX ORDER — DEXTROAMPHETAMINE SACCHARATE, AMPHETAMINE ASPARTATE, DEXTROAMPHETAMINE SULFATE AND AMPHETAMINE SULFATE 2.5; 2.5; 2.5; 2.5 MG/1; MG/1; MG/1; MG/1
10 TABLET ORAL DAILY
Qty: 30 TABLET | Refills: 0 | Status: SHIPPED | OUTPATIENT
Start: 2025-01-10

## 2025-05-06 DIAGNOSIS — F98.8 ADD (ATTENTION DEFICIT DISORDER) WITHOUT HYPERACTIVITY: ICD-10-CM

## 2025-05-06 RX ORDER — DEXTROAMPHETAMINE SACCHARATE, AMPHETAMINE ASPARTATE, DEXTROAMPHETAMINE SULFATE AND AMPHETAMINE SULFATE 2.5; 2.5; 2.5; 2.5 MG/1; MG/1; MG/1; MG/1
10 TABLET ORAL DAILY
Qty: 30 TABLET | Refills: 0 | Status: SHIPPED | OUTPATIENT
Start: 2025-05-06

## 2025-05-06 NOTE — TELEPHONE ENCOUNTER
Last OV:4/15/2024 ADD visit   Last refill:01/10/2025 30 tabs, 0 refill     Patient has an upcoming appointment:  Future Appointments   Date Time Provider Department Center   6/3/2025  3:40 PM Skip Moreland DO [facility] [place]       Medication pended, please sign if appropriate

## 2025-05-06 NOTE — TELEPHONE ENCOUNTER
PATIENT CALLING- NEEDS REFILL ON amphetamine-dextroamphetamine (ADDERALL) 10 MG Oral Tab       [id number] - [place], [place] - [address] AT [facility] OF [facility] ([address]), [phone], [phone]

## 2025-05-07 NOTE — TELEPHONE ENCOUNTER
PA done via [facility]  Prior authorization approved  Payer: [email or web address] [facility] Case ID: [id number]    [phone]  Note from payer: The case has been Approved from  [id number] to [id number]  Approval Details    Authorized from May 1, 2025 to May 7, 2026  Electronic appeal: Not supported  View History

## 2025-08-05 DIAGNOSIS — F98.8 ADD (ATTENTION DEFICIT DISORDER) WITHOUT HYPERACTIVITY: ICD-10-CM

## 2025-08-05 RX ORDER — DEXTROAMPHETAMINE SACCHARATE, AMPHETAMINE ASPARTATE, DEXTROAMPHETAMINE SULFATE AND AMPHETAMINE SULFATE 2.5; 2.5; 2.5; 2.5 MG/1; MG/1; MG/1; MG/1
10 TABLET ORAL DAILY
Qty: 30 TABLET | Refills: 0 | Status: SHIPPED | OUTPATIENT
Start: 2025-08-05

## 2025-08-27 PROCEDURE — 85025 COMPLETE CBC W/AUTO DIFF WBC: CPT | Performed by: FAMILY MEDICINE

## 2025-08-27 PROCEDURE — 80061 LIPID PANEL: CPT | Performed by: FAMILY MEDICINE

## 2025-08-27 PROCEDURE — 84439 ASSAY OF FREE THYROXINE: CPT | Performed by: FAMILY MEDICINE

## 2025-08-27 PROCEDURE — 84443 ASSAY THYROID STIM HORMONE: CPT | Performed by: FAMILY MEDICINE

## 2025-08-27 PROCEDURE — 80053 COMPREHEN METABOLIC PANEL: CPT | Performed by: FAMILY MEDICINE
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
